# Patient Record
Sex: MALE | Race: WHITE | NOT HISPANIC OR LATINO | Employment: UNEMPLOYED | URBAN - METROPOLITAN AREA
[De-identification: names, ages, dates, MRNs, and addresses within clinical notes are randomized per-mention and may not be internally consistent; named-entity substitution may affect disease eponyms.]

---

## 2017-01-11 ENCOUNTER — HOSPITAL ENCOUNTER (OUTPATIENT)
Dept: RADIOLOGY | Facility: MEDICAL CENTER | Age: 32
End: 2017-01-11
Attending: PHYSICIAN ASSISTANT
Payer: COMMERCIAL

## 2017-01-11 ENCOUNTER — OFFICE VISIT (OUTPATIENT)
Dept: URGENT CARE | Facility: PHYSICIAN GROUP | Age: 32
End: 2017-01-11
Payer: COMMERCIAL

## 2017-01-11 VITALS
DIASTOLIC BLOOD PRESSURE: 80 MMHG | WEIGHT: 310 LBS | SYSTOLIC BLOOD PRESSURE: 126 MMHG | HEART RATE: 80 BPM | TEMPERATURE: 97 F | RESPIRATION RATE: 16 BRPM | HEIGHT: 72 IN | OXYGEN SATURATION: 97 % | BODY MASS INDEX: 41.99 KG/M2

## 2017-01-11 DIAGNOSIS — R07.1 CHEST PAIN VARYING WITH BREATHING: ICD-10-CM

## 2017-01-11 DIAGNOSIS — R07.1 CHEST PAIN VARYING WITH BREATHING: Primary | ICD-10-CM

## 2017-01-11 PROCEDURE — 99204 OFFICE O/P NEW MOD 45 MIN: CPT | Performed by: PHYSICIAN ASSISTANT

## 2017-01-11 PROCEDURE — 71020 DX-CHEST-2 VIEWS: CPT

## 2017-01-11 RX ORDER — METHYLPREDNISOLONE 4 MG/1
TABLET ORAL
Qty: 21 TAB | Refills: 0 | Status: SHIPPED | OUTPATIENT
Start: 2017-01-11 | End: 2017-01-17

## 2017-01-11 RX ORDER — TRAMADOL HYDROCHLORIDE 50 MG/1
50 TABLET ORAL EVERY 4 HOURS PRN
Qty: 30 TAB | Refills: 0 | Status: SHIPPED | OUTPATIENT
Start: 2017-01-11

## 2017-01-11 NOTE — MR AVS SNAPSHOT
Antwon Alexander   2017 4:55 PM   Office Visit   MRN: [id number]    Department:  [facility]   Dept Phone:  [phone]    Description:  Male : 1985   Provider:  Vinnie Stephenson PA-C           Reason for Visit     Shoulder Pain L shoulder pain when breathing x 4, hurts when laying on L side, no injury      Allergies as of 2017     No Known Allergies      You were diagnosed with     Chest pain varying with breathing   [[id number]]  -  Primary       Vital Signs     Blood Pressure Pulse Temperature Respirations Height Weight    126/80 mmHg 80 36.1 °C (97 °F) 16 1.829 m (6') 140.615 kg (310 lb)    Body Mass Index Oxygen Saturation Smoking Status             42.03 kg/m2 97% Never Smoker          Basic Information     Date Of Birth Sex Race Ethnicity Preferred Language    1985 Male White Non- English      Problem List              ICD-10-CM Priority Class Noted - Resolved    Hypertriglyceridemia E78.1   3/23/2012 - Present    Impaired fasting glucose R73.01   3/23/2012 - Present    HDL deficiency E78.6   3/23/2012 - Present    Vitamin d deficiency    3/23/2012 - Present    Metabolic syndrome E88.81   3/23/2012 - Present      Health Maintenance        Date Due Completion Dates    IMM DTaP/Tdap/Td Vaccine (1 - Tdap) 10/1/2004 ---    IMM INFLUENZA (1) 2016 ---            Current Immunizations     No immunizations on file.      Below and/or attached are the medications your provider expects you to take. Review all of your home medications and newly ordered medications with your provider and/or pharmacist. Follow medication instructions as directed by your provider and/or pharmacist. Please keep your medication list with you and share with your provider. Update the information when medications are discontinued, doses are changed, or new medications (including over-the-counter products) are added; and carry medication information at all times in the event of emergency situations     Allergies:  No Known Allergies          Medications  Valid as of: January 11, 2017 -  5:55 PM    Generic Name Brand Name Tablet Size Instructions for use    Hydrocodone-Acetaminophen (Tab) NORCO 5-325 MG Take 1-2 Tabs by mouth every four hours as needed.        MethylPREDNISolone (Tablet Therapy Pack) MEDROL DOSEPAK 4 MG Use as directed        TraMADol HCl (Tab) ULTRAM 50 MG Take 1 Tab by mouth every four hours as needed.        .                 Medicines prescribed today were sent to:     [id number] - [address] AT [address] [address]    Phone: [phone] Fax: [phone]    Open 24 Hours?: No      Medication refill instructions:       If your prescription bottle indicates you have medication refills left, it is not necessary to call your provider’s office. Please contact your pharmacy and they will refill your medication.    If your prescription bottle indicates you do not have any refills left, you may request refills at any time through one of the following ways: The online [employer] system (except Urgent Care), by calling your provider’s office, or by asking your pharmacy to contact your provider’s office with a refill request. Medication refills are processed only during regular business hours and may not be available until the next business day. Your provider may request additional information or to have a follow-up visit with you prior to refilling your medication.   *Please Note: Medication refills are assigned a new Rx number when refilled electronically. Your pharmacy may indicate that no refills were authorized even though a new prescription for the same medication is available at the pharmacy. Please request the medicine by name with the pharmacy before contacting your provider for a refill.        Your To Do List     Future Labs/Procedures Complete By Expires    DX-CHEST-2 VIEWS  As directed 1/11/2018      Instructions    Shortness of  Breath  Shortness of breath means you have trouble breathing. It could also mean that you have a medical problem. You should get immediate medical care for shortness of breath.  CAUSES   · Not enough oxygen in the air such as with high altitudes or a smoke-filled room.  · Certain lung diseases, infections, or problems.  · Heart disease or conditions, such as angina or heart failure.  · Low red blood cells (anemia).  · Poor physical fitness, which can cause shortness of breath when you exercise.  · Chest or back injuries or stiffness.  · Being overweight.  · Smoking.  · Anxiety, which can make you feel like you are not getting enough air.  DIAGNOSIS   Serious medical problems can often be found during your physical exam. Tests may also be done to determine why you are having shortness of breath. Tests may include:  · Chest X-rays.  · Lung function tests.  · Blood tests.  · An electrocardiogram (ECG).  · An ambulatory electrocardiogram. An ambulatory ECG records your heartbeat patterns over a 24-hour period.  · Exercise testing.  · A transthoracic echocardiogram (TTE). During echocardiography, sound waves are used to evaluate how blood flows through your heart.  · A transesophageal echocardiogram (AILEEN).  · Imaging scans.  Your health care provider may not be able to find a cause for your shortness of breath after your exam. In this case, it is important to have a follow-up exam with your health care provider as directed.   TREATMENT   Treatment for shortness of breath depends on the cause of your symptoms and can vary greatly.  HOME CARE INSTRUCTIONS   · Do not smoke. Smoking is a common cause of shortness of breath. If you smoke, ask for help to quit.  · Avoid being around chemicals or things that may bother your breathing, such as paint fumes and dust.  · Rest as needed. Slowly resume your usual activities.  · If medicines were prescribed, take them as directed for the full length of time directed. This includes  oxygen and any inhaled medicines.  · Keep all follow-up appointments as directed by your health care provider.  SEEK MEDICAL CARE IF:   · Your condition does not improve in the time expected.  · You have a hard time doing your normal activities even with rest.  · You have any new symptoms.  SEEK IMMEDIATE MEDICAL CARE IF:   · Your shortness of breath gets worse.  · You feel light-headed, faint, or develop a cough not controlled with medicines.  · You start coughing up blood.  · You have pain with breathing.  · You have chest pain or pain in your arms, shoulders, or abdomen.  · You have a fever.  · You are unable to walk up stairs or exercise the way you normally do.  MAKE SURE YOU:  · Understand these instructions.  · Will watch your condition.  · Will get help right away if you are not doing well or get worse.     This information is not intended to replace advice given to you by your health care provider. Make sure you discuss any questions you have with your health care provider.     Document Released: 09/12/2002 Document Revised: 12/23/2014 Document Reviewed: 03/04/2013  [employer] Interactive Patient Education ©2016 [employer] Inc.            Healthcare Interactive Access Code: [email or web address]  Expires: 2/10/2017  4:55 PM    Healthcare Interactive  A secure, online tool to manage your health information     [employer]’s Healthcare Interactive® is a secure, online tool that connects you to your personalized health information from the privacy of your home -- day or night - making it very easy for you to manage your healthcare. Once the activation process is completed, you can even access your medical information using the Healthcare Interactive dmitriy, which is available for free in the Apple Dmitriy store or Google Play store.     Healthcare Interactive provides the following levels of access (as shown below):   My Chart Features   [facility] Doctor [facility]  Specialists [facility]  Urgent  Care Non-[facility]  Primary Care  Doctor   Email your healthcare team securely and privately 24/7 X X  X    Manage appointments: schedule your next appointment; view details of past/upcoming appointments X      Request prescription refills. X      View recent personal medical records, including lab and immunizations X X X X   View health record, including health history, allergies, medications X X X X   Read reports about your outpatient visits, procedures, consult and ER notes X X X X   See your discharge summary, which is a recap of your hospital and/or ER visit that includes your diagnosis, lab results, and care plan. X X       How to register for [employer]:  1. Go to  [email or web address].  2. Click on the Sign Up Now box, which takes you to the New Member Sign Up page. You will need to provide the following information:  a. Enter your [employer] Access Code exactly as it appears at the top of this page. (You will not need to use this code after you’ve completed the sign-up process. If you do not sign up before the expiration date, you must request a new code.)   b. Enter your date of birth.   c. Enter your home email address.   d. Click Submit, and follow the next screen’s instructions.  3. Create a [employer] ID. This will be your [employer] login ID and cannot be changed, so think of one that is secure and easy to remember.  4. Create a [employer] password. You can change your password at any time.  5. Enter your Password Reset Question and Answer. This can be used at a later time if you forget your password.   6. Enter your e-mail address. This allows you to receive e-mail notifications when new information is available in [employer].  7. Click Sign Up. You can now view your health information.    For assistance activating your [employer] account, call [phone]

## 2017-01-12 NOTE — PATIENT INSTRUCTIONS
Shortness of Breath  Shortness of breath means you have trouble breathing. It could also mean that you have a medical problem. You should get immediate medical care for shortness of breath.  CAUSES   · Not enough oxygen in the air such as with high altitudes or a smoke-filled room.  · Certain lung diseases, infections, or problems.  · Heart disease or conditions, such as angina or heart failure.  · Low red blood cells (anemia).  · Poor physical fitness, which can cause shortness of breath when you exercise.  · Chest or back injuries or stiffness.  · Being overweight.  · Smoking.  · Anxiety, which can make you feel like you are not getting enough air.  DIAGNOSIS   Serious medical problems can often be found during your physical exam. Tests may also be done to determine why you are having shortness of breath. Tests may include:  · Chest X-rays.  · Lung function tests.  · Blood tests.  · An electrocardiogram (ECG).  · An ambulatory electrocardiogram. An ambulatory ECG records your heartbeat patterns over a 24-hour period.  · Exercise testing.  · A transthoracic echocardiogram (TTE). During echocardiography, sound waves are used to evaluate how blood flows through your heart.  · A transesophageal echocardiogram (AILEEN).  · Imaging scans.  Your health care provider may not be able to find a cause for your shortness of breath after your exam. In this case, it is important to have a follow-up exam with your health care provider as directed.   TREATMENT   Treatment for shortness of breath depends on the cause of your symptoms and can vary greatly.  HOME CARE INSTRUCTIONS   · Do not smoke. Smoking is a common cause of shortness of breath. If you smoke, ask for help to quit.  · Avoid being around chemicals or things that may bother your breathing, such as paint fumes and dust.  · Rest as needed. Slowly resume your usual activities.  · If medicines were prescribed, take them as directed for the full length of time directed. This  includes oxygen and any inhaled medicines.  · Keep all follow-up appointments as directed by your health care provider.  SEEK MEDICAL CARE IF:   · Your condition does not improve in the time expected.  · You have a hard time doing your normal activities even with rest.  · You have any new symptoms.  SEEK IMMEDIATE MEDICAL CARE IF:   · Your shortness of breath gets worse.  · You feel light-headed, faint, or develop a cough not controlled with medicines.  · You start coughing up blood.  · You have pain with breathing.  · You have chest pain or pain in your arms, shoulders, or abdomen.  · You have a fever.  · You are unable to walk up stairs or exercise the way you normally do.  MAKE SURE YOU:  · Understand these instructions.  · Will watch your condition.  · Will get help right away if you are not doing well or get worse.     This information is not intended to replace advice given to you by your health care provider. Make sure you discuss any questions you have with your health care provider.     Document Released: 09/12/2002 Document Revised: 12/23/2014 Document Reviewed: 03/04/2013  [employer] Interactive Patient Education ©2016 [employer]

## 2017-01-12 NOTE — PROGRESS NOTES
Subjective:      Pt is a 31 y.o. male who presents with Shoulder Pain            Shoulder Pain  This is a new problem. The current episode started in the past 7 days. The problem occurs constantly. The problem has been gradually worsening. Exacerbated by:  deeep breathing or laying on the left side and breathing. He has tried nothing for the symptoms.   Pt states for the last 4 days he has noted left shoulder pain with deep breathing or laying on his left side and breathing causes him pain as well. Pt denies trauma or injury, cardiac or pulmonary hx. Pt has not taken any Rx medications for this condition. Pt states the pain is a 4/10, aching in nature and worse at night. Pt denies  NVD, paresthesias, headaches, dizziness, change in vision, hives, or other joint pain. The pt's medication list, problem list, and allergies have been evaluated and reviewed during today's visit.    PMH:  Past Medical History   Diagnosis Date   • Asthma    • GERD (gastroesophageal reflux disease)    • Hypertriglyceridemia 3/23/2012   • Impaired fasting glucose 3/23/2012   • HDL deficiency 3/23/2012   • Vitamin d deficiency 3/23/2012   • Metabolic syndrome 3/23/2012       PSH:  Negative per pt.      Fam Hx:    family history includes Diabetes in his father; Heart Disease in his father; Hypertension in his father.  Family Status   Relation Status Death Age   • Father  64     MI       Soc HX:  Social History     Social History   • Marital Status: Single     Spouse Name: N/A   • Number of Children: N/A   • Years of Education: N/A     Occupational History   • Not on file.     Social History Main Topics   • Smoking status: Never Smoker    • Smokeless tobacco: Never Used   • Alcohol Use: Yes      Comment: occ   • Drug Use: No   • Sexual Activity:     Partners: Female     Other Topics Concern   • Not on file     Social History Narrative   • No narrative on file         Medications:    Current outpatient prescriptions:   •  tramadol  (ULTRAM) 50 MG Tab, Take 1 Tab by mouth every four hours as needed., Disp: 30 Tab, Rfl: 0  •  MethylPREDNISolone (MEDROL DOSEPAK) 4 MG Tablet Therapy Pack, Use as directed, Disp: 21 Tab, Rfl: 0  •  hydrocodone-acetaminophen (NORCO) 5-325 MG TABS per tablet, Take 1-2 Tabs by mouth every four hours as needed., Disp: 20 Tab, Rfl: 0      Allergies:  Review of patient's allergies indicates no known allergies.        ROS  Constitutional: Negative for fever, chills and malaise/fatigue.   HENT: Negative for congestion and sore throat.    Eyes: Negative for blurred vision, double vision and photophobia.   Respiratory: Negative for cough and shortness of breath.    Cardiovascular: Negative for chest pain and palpitations.   Gastrointestinal: Negative for heartburn, nausea, vomiting, abdominal pain, diarrhea and constipation.   Genitourinary: Negative for dysuria and flank pain.   Musculoskeletal:  +left shoulder pain with deep breathing  Skin: Negative for itching and rash.   Neurological: Negative for dizziness, tingling and headaches.   Endo/Heme/Allergies: Does not bruise/bleed easily.   Psychiatric/Behavioral: Negative for depression. The patient is not nervous/anxious.           Objective:     /80 mmHg  Pulse 80  Temp(Src) 36.1 °C (97 °F)  Resp 16  Ht 1.829 m (6')  Wt 140.615 kg (310 lb)  BMI 42.03 kg/m2  SpO2 97%     Physical Exam       Constitutional: PT is oriented to person, place, and time. PT appears well-developed and well-nourished. No distress.   HENT:   Head: Normocephalic and atraumatic.   Mouth/Throat: Oropharynx is clear and moist. No oropharyngeal exudate.   Eyes: Conjunctivae normal and EOM are normal. Pupils are equal, round, and reactive to light.   Neck: Normal range of motion. Neck supple. No thyromegaly present.   Cardiovascular: Normal rate, regular rhythm, normal heart sounds and intact distal pulses.  Exam reveals no gallop and no friction rub.    No murmur heard.  Pulmonary/Chest:  Effort normal and breath sounds normal. No respiratory distress. PT has no wheezes. PT has no rales. Pt exhibits no tenderness.   Abdominal: Soft. Bowel sounds are normal. PT exhibits no distension and no mass. There is no tenderness. There is no rebound and no guarding.   Musculoskeletal: Normal range of motion. PT exhibits no edema and no tenderness.   Neurological: PT is alert and oriented to person, place, and time. PT has normal reflexes. No cranial nerve deficit.   Skin: Skin is warm and dry. No rash noted. PT is not diaphoretic. No erythema.       Psychiatric: PT has a normal mood and affect. PT behavior is normal. Judgment and thought content normal.      RADS:    Narrative        1/11/2017 5:29 PM    HISTORY/REASON FOR EXAM:  Chest Pain  Left shoulder pain during inhalation x 4 days      TECHNIQUE/EXAM DESCRIPTION AND NUMBER OF VIEWS:  Two views of the chest.    COMPARISON:  None.    FINDINGS:  No pulmonary infiltrates or consolidations are noted.  No pleural effusions, no pneumothorax are appreciated.  Normal cardiopericardial silhouette.  Visualized osseous structures are unremarkable.       Impression        No active cardiopulmonary disease.            Reading Provider Reading Date     Eugenio Sims M.D. Jan 11, 2017            Signing Provider Signing Date Signing Time     Eugenio Sims M.D. Jan 11, 2017  5:43 PM       Assessment/Plan:     1. Chest pain varying with breathing    - DX-CHEST-2 VIEWS; Future  - tramadol (ULTRAM) 50 MG Tab; Take 1 Tab by mouth every four hours as needed.  Dispense: 30 Tab; Refill: 0  - MethylPREDNISolone (MEDROL DOSEPAK) 4 MG Tablet Therapy Pack; Use as directed  Dispense: 21 Tab; Refill: 0    [facility] Aware web site evaluation: I have obtained and reviewed patient utilization report from [facility] pharmacy database prior to writing prescription for controlled substance II, III or IV per [place] bill . Based on the report and my clinical assessment the  prescription is medically necessary.   NSAIDs for pain 1-5, Ultram for pain 6-10 or to help get to sleep.  STRICT ER precautions discussed with pt and pt in full understanding and agreement  Cardiopulmonary issues unlikely. More likely musculoskeletal causing the left sided pain.  RICE therapy discussed  Gentle ROM exercises discussed  WBAT left UE  Ice/heat therapy discussed  Rest, fluids encouraged.  AVS with medical info given.  Pt was in full understanding and agreement with the plan.  Follow-up as needed if symptoms worsen or fail to improve.